# Patient Record
Sex: FEMALE | Race: WHITE | HISPANIC OR LATINO | Employment: STUDENT | URBAN - METROPOLITAN AREA
[De-identification: names, ages, dates, MRNs, and addresses within clinical notes are randomized per-mention and may not be internally consistent; named-entity substitution may affect disease eponyms.]

---

## 2018-07-29 ENCOUNTER — HOSPITAL ENCOUNTER (EMERGENCY)
Facility: OTHER | Age: 18
Discharge: HOME | End: 2018-07-29
Attending: EMERGENCY MEDICINE

## 2018-07-29 VITALS
OXYGEN SATURATION: 100 % | HEIGHT: 63 IN | WEIGHT: 170 LBS | SYSTOLIC BLOOD PRESSURE: 140 MMHG | RESPIRATION RATE: 18 BRPM | BODY MASS INDEX: 30.12 KG/M2 | TEMPERATURE: 99 F | HEART RATE: 110 BPM | DIASTOLIC BLOOD PRESSURE: 77 MMHG

## 2018-07-29 DIAGNOSIS — J02.9 VIRAL PHARYNGITIS: Primary | ICD-10-CM

## 2018-07-29 LAB
B-HCG UR QL: NEGATIVE
CTP QC/QA: YES
DEPRECATED S PYO AG THROAT QL EIA: NEGATIVE

## 2018-07-29 PROCEDURE — 63600175 PHARM REV CODE 636 W HCPCS: Performed by: EMERGENCY MEDICINE

## 2018-07-29 PROCEDURE — 87081 CULTURE SCREEN ONLY: CPT

## 2018-07-29 PROCEDURE — 25000003 PHARM REV CODE 250: Performed by: EMERGENCY MEDICINE

## 2018-07-29 PROCEDURE — 81025 URINE PREGNANCY TEST: CPT | Performed by: EMERGENCY MEDICINE

## 2018-07-29 PROCEDURE — 99283 EMERGENCY DEPT VISIT LOW MDM: CPT | Mod: 25

## 2018-07-29 PROCEDURE — 87880 STREP A ASSAY W/OPTIC: CPT

## 2018-07-29 PROCEDURE — 96372 THER/PROPH/DIAG INJ SC/IM: CPT

## 2018-07-29 RX ORDER — DEXAMETHASONE SODIUM PHOSPHATE 4 MG/ML
8 INJECTION, SOLUTION INTRA-ARTICULAR; INTRALESIONAL; INTRAMUSCULAR; INTRAVENOUS; SOFT TISSUE
Status: COMPLETED | OUTPATIENT
Start: 2018-07-29 | End: 2018-07-29

## 2018-07-29 RX ORDER — ACETAMINOPHEN 500 MG
1000 TABLET ORAL
Status: COMPLETED | OUTPATIENT
Start: 2018-07-29 | End: 2018-07-29

## 2018-07-29 RX ADMIN — ACETAMINOPHEN 1000 MG: 500 TABLET ORAL at 12:07

## 2018-07-29 RX ADMIN — DEXAMETHASONE SODIUM PHOSPHATE 8 MG: 4 INJECTION, SOLUTION INTRAMUSCULAR; INTRAVENOUS at 12:07

## 2018-07-29 NOTE — DISCHARGE INSTRUCTIONS
Tylenol for pain. Can use chloraseptic spray as needed for pain.  Steroids should improve swallowing

## 2018-07-29 NOTE — ED TRIAGE NOTES
Pt c/o sore throat with painful swallowing X 4 days with fever. Pt taking tylenol to treat fever. Pt also c/o fatigue. Denies N/V.

## 2018-07-29 NOTE — ED NOTES
Pt is [facility] restroom attempting to provide urine specimen. Unable at present. Requested pt to room 13 for intake.

## 2018-07-29 NOTE — ED PROVIDER NOTES
Encounter Date: 7/29/2018    SCRIBE #1 NOTE: I, Demetri Bryan, am scribing for, and in the presence of, Dr. Del Rio .       History     Chief Complaint   Patient presents with    Sore Throat     sore throat since Thursday with fever.      Time seen by provider: 11:49 AM    This is a 17 y.o. female who presents with complaint of sudden, constant sore throat x 4 days. She rates the pain an 8/10, and notes that she has had difficulty swallowing. She took Tylenol with no relief of the sore throat. She has been staying hydrated with Pedialyte. She is also endorsing subjective fever, headaches, fatigue, weakness, and decreased appetite x 4 days. She reportedly experienced mild chest pain the last two days, but denies chills, SOB, abdominal pain, N/V/D, or urinary symptoms. Her last menstrual period was five days ago.       The history is provided by the patient.     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 No pertinent family history.  Social History   Substance Use Topics    Smoking status: Never Smoker    Smokeless tobacco: Never Used    Alcohol use No     Review of Systems   Constitutional: Positive for appetite change, fatigue and fever. Negative for chills.   HENT: Positive for trouble swallowing.    Respiratory: Negative for shortness of breath.    Cardiovascular: Positive for chest pain (resolved).   Gastrointestinal: Negative for abdominal pain, diarrhea, nausea and vomiting.   Genitourinary: Negative for difficulty urinating, dysuria, frequency and urgency.   Musculoskeletal: Negative for back pain.   Skin: Negative for rash.   Neurological: Positive for weakness. Negative for headaches.   Psychiatric/Behavioral: Negative for confusion.       Physical Exam     Initial Vitals [07/29/18 1122]    BP Pulse Resp Temp SpO2   (!) 140/77 (!) 121 18 100.1 °F (37.8 °C) 99 %      MAP       --         Physical Exam    Nursing note and vitals reviewed.  Constitutional: She appears well-developed and well-nourished. No distress.   HENT:   Head: Normocephalic and atraumatic.   Tympanic membranes are clear bilaterally. Bilateral tonsillar enlargement with exudates. Uvula midline. Posterior pharyngeal erythema present.   Eyes: Conjunctivae and EOM are normal. Pupils are equal, round, and reactive to light.   Neck: Normal range of motion. Neck supple.   Bilateral anterior cervical lymphadenopathy present.    Cardiovascular: Regular rhythm and normal heart sounds. Tachycardia present.    Pulmonary/Chest: Breath sounds normal. No respiratory distress.   Musculoskeletal: Normal range of motion.   Neurological: She is alert and oriented to person, place, and time. She has normal strength.   Skin: Skin is warm and dry.   Psychiatric: She has a normal mood and affect. Her behavior is normal. Judgment and thought content normal.         ED Course   Procedures  Labs Reviewed   THROAT SCREEN, RAPID   CULTURE, STREP A,  THROAT   POCT URINE PREGNANCY          Imaging Results    None          Medical Decision Making:   Initial Assessment:   11:55 AM 17 year old female presenting with sore throat four days.   Differential Diagnosis:   Streptococcal pharyngitis, mononucleosis, peritonsillar abscess, or retropharyngeal abscess.  Clinical Tests:   Lab Tests: Ordered and Reviewed  ED Management:  11:58 AM Pt well appearing. No acute distress. Full ROM of neck. Exam with midline uvula, bilateral tonsillar erythema, enlargement and exudates. (-) PTA.  Strep swab performed. Treatment with Decadron (8 mg) IM and Tylenol PO challenge.  Strep swab is negative. Patient likely has mono.  She has no left upper quadrant tenderness on exam.  Discussed no contact sports and symptomatic treatment.  Repeat vital signs improved.  Patient stable for  discharge            Scribe Attestation:   Scribe #1: I performed the above scribed service and the documentation accurately describes the services I performed. I attest to the accuracy of the note.    Attending Attestation:           Physician Attestation for Scribe:  Physician Attestation Statement for Scribe #1: I, Dr. Del Rio, reviewed documentation, as scribed by Demetri Bryan  in my presence, and it is both accurate and complete.     Comments: I, Dr. Claire Del Rio, personally performed the services described in this documentation. All medical record entries made by the scribe were at my direction and in my presence.  I have reviewed the chart and agree that the record reflects my personal performance and is accurate and complete. Claire Del Rio MD.                 Clinical Impression:     1. Viral pharyngitis            Disposition:   Disposition: Discharged  Condition: Stable                        Claire Del Rio MD  07/29/18 1500

## 2018-07-29 NOTE — ED NOTES
Patient Identifiers for Nelly Arreola checked and correct  LOC: The patient is awake, alert and aware of environment with an appropriate affect, the patient is oriented x 3 and speaking appropriate.  APPEARANCE: Patient resting comfortably and in no acute distress. The patient is clean and well groomed. The patient's clothing is properly fastened.  SKIN: The skin is warm and dry. The patient has normal skin turgor and moist mucus membranes. No rashes or lesions upon observation. Skin Intact , no breakdown noted.  Musculoskeletal :  Normal range of motion noted. Moves all extremities well.  RESPIRATORY: Airway is open and patent, respirations are spontaneous, patient has a normal effort and rate. Breath sounds are clear & equal, bilaterally.  CARDIAC: Patient has an elevated rate and normal rhythm, no peripheral edema noted, capillary refill < 3 seconds.   PULSES: 2+ radial & pedal pulses, symmetrical in all extremities.  ENT:  Bilateral tonsillar swelling with exudate. Bilateral anterior cervical lymphadenopathy.  Will continue to monitor

## 2018-07-31 LAB — BACTERIA THROAT CULT: NORMAL

## 2019-03-18 ENCOUNTER — HOSPITAL ENCOUNTER (EMERGENCY)
Facility: OTHER | Age: 19
Discharge: HOME | End: 2019-03-18
Attending: EMERGENCY MEDICINE
Payer: MEDICAID

## 2019-03-18 VITALS
SYSTOLIC BLOOD PRESSURE: 127 MMHG | HEIGHT: 67 IN | DIASTOLIC BLOOD PRESSURE: 61 MMHG | RESPIRATION RATE: 17 BRPM | OXYGEN SATURATION: 100 % | BODY MASS INDEX: 36.1 KG/M2 | WEIGHT: 230 LBS | HEART RATE: 78 BPM | TEMPERATURE: 98 F

## 2019-03-18 DIAGNOSIS — R07.9 CHEST PAIN: ICD-10-CM

## 2019-03-18 DIAGNOSIS — R07.89 ATYPICAL CHEST PAIN: Primary | ICD-10-CM

## 2019-03-18 LAB
ALBUMIN SERPL BCP-MCNC: 4.3 G/DL
ALP SERPL-CCNC: 72 U/L
ALT SERPL W/O P-5'-P-CCNC: 18 U/L
ANION GAP SERPL CALC-SCNC: 7 MMOL/L
AST SERPL-CCNC: 16 U/L
B-HCG UR QL: NEGATIVE
BASOPHILS # BLD AUTO: 0.01 K/UL
BASOPHILS NFR BLD: 0.1 %
BILIRUB SERPL-MCNC: 0.3 MG/DL
BUN SERPL-MCNC: 10 MG/DL
CALCIUM SERPL-MCNC: 10 MG/DL
CHLORIDE SERPL-SCNC: 106 MMOL/L
CO2 SERPL-SCNC: 28 MMOL/L
CREAT SERPL-MCNC: 0.8 MG/DL
CTP QC/QA: YES
D DIMER PPP IA.FEU-MCNC: 0.24 MG/L FEU
DIFFERENTIAL METHOD: ABNORMAL
EOSINOPHIL # BLD AUTO: 0.1 K/UL
EOSINOPHIL NFR BLD: 1.2 %
ERYTHROCYTE [DISTWIDTH] IN BLOOD BY AUTOMATED COUNT: 12.8 %
EST. GFR  (AFRICAN AMERICAN): >60 ML/MIN/1.73 M^2
EST. GFR  (NON AFRICAN AMERICAN): >60 ML/MIN/1.73 M^2
GIANT PLATELETS BLD QL SMEAR: PRESENT
GLUCOSE SERPL-MCNC: 93 MG/DL
HCT VFR BLD AUTO: 43.8 %
HGB BLD-MCNC: 14.8 G/DL
LYMPHOCYTES # BLD AUTO: 3.1 K/UL
LYMPHOCYTES NFR BLD: 33.7 %
MCH RBC QN AUTO: 30.1 PG
MCHC RBC AUTO-ENTMCNC: 33.8 G/DL
MCV RBC AUTO: 89 FL
MONOCYTES # BLD AUTO: 0.6 K/UL
MONOCYTES NFR BLD: 6.1 %
NEUTROPHILS # BLD AUTO: 5.3 K/UL
NEUTROPHILS NFR BLD: 58.9 %
PLATELET # BLD AUTO: 281 K/UL
PLATELET BLD QL SMEAR: ABNORMAL
PMV BLD AUTO: 10.1 FL
POIKILOCYTOSIS BLD QL SMEAR: SLIGHT
POTASSIUM SERPL-SCNC: 4.4 MMOL/L
PROT SERPL-MCNC: 7.5 G/DL
RBC # BLD AUTO: 4.92 M/UL
SODIUM SERPL-SCNC: 141 MMOL/L
TSH SERPL DL<=0.005 MIU/L-ACNC: 2.67 UIU/ML
WBC # BLD AUTO: 9.09 K/UL
WBC TOXIC VACUOLES BLD QL SMEAR: PRESENT

## 2019-03-18 PROCEDURE — 93010 ELECTROCARDIOGRAM REPORT: CPT | Mod: ,,, | Performed by: INTERNAL MEDICINE

## 2019-03-18 PROCEDURE — 99284 EMERGENCY DEPT VISIT MOD MDM: CPT

## 2019-03-18 PROCEDURE — 85379 FIBRIN DEGRADATION QUANT: CPT

## 2019-03-18 PROCEDURE — 93010 EKG 12-LEAD: ICD-10-PCS | Mod: ,,, | Performed by: INTERNAL MEDICINE

## 2019-03-18 PROCEDURE — 85025 COMPLETE CBC W/AUTO DIFF WBC: CPT

## 2019-03-18 PROCEDURE — 93005 ELECTROCARDIOGRAM TRACING: CPT

## 2019-03-18 PROCEDURE — 81025 URINE PREGNANCY TEST: CPT | Performed by: EMERGENCY MEDICINE

## 2019-03-18 PROCEDURE — 84443 ASSAY THYROID STIM HORMONE: CPT

## 2019-03-18 PROCEDURE — 80053 COMPREHEN METABOLIC PANEL: CPT

## 2019-03-18 NOTE — ED PROVIDER NOTES
"Encounter Date: 3/18/2019    SCRIBE #1 NOTE: I, Alberto Verma, am scribing for, and in the presence of, Dr. Lopez.       History     Chief Complaint   Patient presents with    Chest Pain     Pt reports intermittent chest pain for a month, pt denies any palpitations but does report SOB when having "the chest pains", denies any chills, also has Birth control implant placed 3 weeks ago     Seen by provider: 2:07 PM    Patient is a 18 y.o. female who presents to the ED with complaint of intermittent chest pain for about 1.5 months. Patient reports experiencing a "sharp pain" in the left upper chest, which occurs suddenly with no clear inciting factors then resolves on its own. She states the pain is worse with pressing on the chest. She reports associated intermittent shortness of breath, which occurs suddenly and causes her to "gasp for air," as well as lightheadedness and headaches. She also reports intermittent subjective fevers which have been occurring once or twice a month for three months. She denies chills, cough, vomiting, diarrhea, or leg swelling. She denies experiencing chest pain with exertion. She denies any past cardiac history. She reports she gained 60 pounds in the last year unintentionally. She reports she took this semester off from college due to stress and anxiety; this would have been the spring semester of her sophomore year. She has not had any mental health counseling, but denies any stress or anxiety since returning home a few months ago. She had a nexplanon implant placed three weeks ago, but notes her symptoms began prior to this. She does not take any daily prescription medications. She denies use of tobacco.       The history is provided by the patient.     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 No pertinent family history.  Social History     Tobacco Use    Smoking status: Never Smoker    Smokeless tobacco: Never Used   Substance Use Topics    Alcohol use: No    Drug use: No     Review of Systems   Constitutional: Positive for fever (intermittent subjective fevers) and unexpected weight change (gained 60 lbs in one year). Negative for appetite change and chills.   HENT: Negative for congestion and sore throat.    Respiratory: Positive for shortness of breath (intermittent). Negative for cough.    Cardiovascular: Positive for chest pain (intermittent). Negative for palpitations and leg swelling.   Gastrointestinal: Negative for abdominal pain, nausea and vomiting.   Genitourinary: Negative for dysuria.   Musculoskeletal: Negative for back pain.   Skin: Negative for rash.   Neurological: Positive for light-headedness and headaches. Negative for weakness.   Hematological: Does not bruise/bleed easily.   Psychiatric/Behavioral: Negative for confusion.       Physical Exam     Initial Vitals [03/18/19 1334]   BP Pulse Resp Temp SpO2   (!) 142/94 97 17 98.5 °F (36.9 °C) 100 %      MAP       --         Physical Exam    Nursing note and vitals reviewed.  Constitutional: She appears well-developed and well-nourished. She is not diaphoretic. No distress.   HENT:   Head: Normocephalic and atraumatic.   Mouth/Throat: Oropharynx is clear and moist.   Eyes: Conjunctivae and EOM are normal. Pupils are equal, round, and reactive to light.   Neck: Normal range of motion. Neck supple.   Cardiovascular: Normal rate, regular rhythm and normal heart sounds. Exam reveals no gallop and no friction rub.    No murmur heard.  Pulmonary/Chest: Breath sounds normal. No respiratory distress. She has no wheezes. She has no rhonchi. She has no rales.   Abdominal: Soft. Bowel sounds are normal. She exhibits no distension. There is no tenderness. There is no rebound and no guarding.   Musculoskeletal:  Normal range of motion. She exhibits no edema.   Neurological: She is alert and oriented to person, place, and time. She has normal strength. No cranial nerve deficit or sensory deficit.   Skin: Skin is warm and dry.   Psychiatric: She has a normal mood and affect. Her behavior is normal. Judgment and thought content normal.         ED Course   Procedures  Labs Reviewed   CBC W/ AUTO DIFFERENTIAL - Abnormal; Notable for the following components:       Result Value    Platelet Estimate Clumped (*)     All other components within normal limits   COMPREHENSIVE METABOLIC PANEL - Abnormal; Notable for the following components:    Anion Gap 7 (*)     All other components within normal limits    Narrative:     Recoll. [id number] by PLM at 03/18/2019 15:19, reason: Specimen   hemolyzed. Notified Gale RN ER at 1519. Requested lab to   collect.   D DIMER, QUANTITATIVE    Narrative:     Recoll. [id number] by PLM at 03/18/2019 15:19, reason: Specimen   hemolyzed. Notified Gale RN ER at 1519. Requested lab to   collect.   TSH    Narrative:     Recoll. [id number] by PLM at 03/18/2019 15:19, reason: Specimen   hemolyzed. Notified Gale RN ER at 1519. Requested lab to   collect.   POCT URINE PREGNANCY     EKG Readings: (Independently Interpreted)   Sinus rhythm. Rate of 97 bpm. No ischemia. No arrhythmia. No S1Q3T3.     ECG Results          EKG 12-lead (Final result)  Result time 03/19/19 12:26:25    Final result by Interface, Lab In [facility] (03/19/19 12:26:25)                 Narrative:    Test Reason : R07.9,    Vent. Rate : 097 BPM     Atrial Rate : 097 BPM     P-R Int : 116 ms          QRS Dur : 080 ms      QT Int : 344 ms       P-R-T Axes : 070 069 046 degrees     QTc Int : 436 ms    Normal sinus rhythm with sinus arrhythmia  Normal ECG    Confirmed by Ricky Calvillo MD ([id number]) on 3/19/2019 12:26:16 PM    Referred By: [email or web address]   SELF           Confirmed By:Ricky Calvillo MD                             EKG 12-LEAD  (Final result)  Result time 03/19/19 11:30:29              Imaging Results    None          Medical Decision Making:   Independently Interpreted Test(s):   I have ordered and independently interpreted EKG Reading(s) - see prior notes  Clinical Tests:   Lab Tests: Ordered and Reviewed  Medical Tests: Ordered and Reviewed            Scribe Attestation:   Scribe #1: I performed the above scribed service and the documentation accurately describes the services I performed. I attest to the accuracy of the note.    Attending Attestation:           Physician Attestation for Scribe:  Physician Attestation Statement for Scribe #1: I, Dr. Lopez, reviewed documentation, as scribed by Alberto Verma in my presence, and it is both accurate and complete.           Patient presents with intermittent chest pain for nearly 6 weeks.  Not associated with exertion.  Mild shortness of breath. No history of cardiac disease.  EKG unremarkable. She did recently started on birth control, implantable device.  No history of thromboembolic disease in the past.  D-dimer negative. Thank pretest probability was low and she does not need further imaging for PE.  Basic laboratory studies unremarkable.  At this time suspect pleurisy, musculoskeletal, or other benign cause of the chest pain.  Discussed return precautions, specifically symptoms are would be worrisome for cardiac or pulmonary etiology.  Encouraged follow-up with [facility] or other primary care clinic, especially if symptoms persist.  Recommend over-the-counter analgesics.             Clinical Impression:     1. Atypical chest pain    2. Chest pain                                Gino Lopez II, MD  03/19/19 9368

## 2019-03-18 NOTE — ED TRIAGE NOTES
Pt reports to ED c/o intermittent episodes of mid sternal chest pain with associated SOB x 1.5 months. Pain to midsternal area upon palpation. Pt denies nausea, vomiting, numbness/tingling to extremities, fevers or chills. Pt states SOB is more persistent at night. Birth control implant placed approx 3 weeks PTA. Pt denies cough or URI symptoms.     Patient identifiers for Nelly Arreola checked and correct.  LOC: Patient is awake, alert, and aware of environment with an appropriate affect. Patient is oriented x 3 and speaking appropriately.  APPEARANCE: Patient resting comfortably and in no acute distress. Patient is clean and well groomed, patient's clothing is properly fastened.  SKIN: The skin is warm and dry. Patient has normal skin turgor and moist mucus membrances. Skin is intact; no bruising or breakdown noted.  MUSKULOSKELETAL: Patient is moving all extremities well, no obvious swelling or deformities noted. Pulses intact.   RESPIRATORY: Airway is open and patent. Respirations are spontaneous, even and unlabored. Normal effort and rate noted.  CARDIAC: Patient has a normal rate and rhythm. No peripheral edema noted. Capillary refill < 3 seconds.    Allergies reported:   Review of patient's allergies indicates:   Allergen Reactions    Ibuprofen Anxiety